# Patient Record
Sex: FEMALE | Race: AMERICAN INDIAN OR ALASKA NATIVE | NOT HISPANIC OR LATINO
[De-identification: names, ages, dates, MRNs, and addresses within clinical notes are randomized per-mention and may not be internally consistent; named-entity substitution may affect disease eponyms.]

---

## 2019-07-12 ENCOUNTER — APPOINTMENT (OUTPATIENT)
Dept: SURGERY | Facility: CLINIC | Age: 24
End: 2019-07-12
Payer: MEDICAID

## 2019-07-12 ENCOUNTER — TRANSCRIPTION ENCOUNTER (OUTPATIENT)
Age: 24
End: 2019-07-12

## 2019-07-12 ENCOUNTER — OUTPATIENT (OUTPATIENT)
Dept: OUTPATIENT SERVICES | Facility: HOSPITAL | Age: 24
LOS: 1 days | End: 2019-07-12
Payer: MEDICAID

## 2019-07-12 VITALS
HEIGHT: 65.5 IN | TEMPERATURE: 97.6 F | HEART RATE: 72 BPM | SYSTOLIC BLOOD PRESSURE: 111 MMHG | BODY MASS INDEX: 39.03 KG/M2 | WEIGHT: 237.13 LBS | OXYGEN SATURATION: 98 % | DIASTOLIC BLOOD PRESSURE: 76 MMHG

## 2019-07-12 LAB — CA-I SERPL-SCNC: 1.26 MMOL/L

## 2019-07-12 PROCEDURE — 74241: CPT

## 2019-07-12 PROCEDURE — 99203 OFFICE O/P NEW LOW 30 MIN: CPT

## 2019-07-12 PROCEDURE — 74241: CPT | Mod: 26

## 2019-07-13 LAB
ALBUMIN SERPL ELPH-MCNC: 4.4 G/DL
ALP BLD-CCNC: 77 U/L
ALT SERPL-CCNC: 14 U/L
ANION GAP SERPL CALC-SCNC: 14 MMOL/L
AST SERPL-CCNC: 20 U/L
BASOPHILS # BLD AUTO: 0.02 K/UL
BASOPHILS NFR BLD AUTO: 0.5 %
BILIRUB SERPL-MCNC: 0.7 MG/DL
BUN SERPL-MCNC: 10 MG/DL
CALCIUM SERPL-MCNC: 9.5 MG/DL
CALCIUM SERPL-MCNC: 9.5 MG/DL
CHLORIDE SERPL-SCNC: 102 MMOL/L
CHOLEST SERPL-MCNC: 199 MG/DL
CHOLEST/HDLC SERPL: 4.9 RATIO
CO2 SERPL-SCNC: 23 MMOL/L
CREAT SERPL-MCNC: 0.7 MG/DL
EOSINOPHIL # BLD AUTO: 0.06 K/UL
EOSINOPHIL NFR BLD AUTO: 1.4 %
ESTIMATED AVERAGE GLUCOSE: 100 MG/DL
FOLATE SERPL-MCNC: 13.1 NG/ML
GLUCOSE SERPL-MCNC: 86 MG/DL
HBA1C MFR BLD HPLC: 5.1 %
HCT VFR BLD CALC: 41.6 %
HDLC SERPL-MCNC: 41 MG/DL
HGB BLD-MCNC: 12.4 G/DL
IMM GRANULOCYTES NFR BLD AUTO: 0 %
INR PPP: 1.01 RATIO
IRON SATN MFR SERPL: 17 %
IRON SERPL-MCNC: 87 UG/DL
LDLC SERPL CALC-MCNC: 111 MG/DL
LYMPHOCYTES # BLD AUTO: 2.3 K/UL
LYMPHOCYTES NFR BLD AUTO: 53.1 %
MAN DIFF?: NORMAL
MCHC RBC-ENTMCNC: 25.5 PG
MCHC RBC-ENTMCNC: 29.8 GM/DL
MCV RBC AUTO: 85.4 FL
MONOCYTES # BLD AUTO: 0.3 K/UL
MONOCYTES NFR BLD AUTO: 6.9 %
NEUTROPHILS # BLD AUTO: 1.65 K/UL
NEUTROPHILS NFR BLD AUTO: 38.1 %
PARATHYROID HORMONE INTACT: 82 PG/ML
PLATELET # BLD AUTO: 237 K/UL
POTASSIUM SERPL-SCNC: 4.7 MMOL/L
PREALB SERPL NEPH-MCNC: 29 MG/DL
PROT SERPL-MCNC: 7.5 G/DL
PT BLD: 11.6 SEC
RBC # BLD: 4.87 M/UL
RBC # FLD: 13.1 %
SODIUM SERPL-SCNC: 139 MMOL/L
TIBC SERPL-MCNC: 498 UG/DL
TRIGL SERPL-MCNC: 236 MG/DL
TSH SERPL-ACNC: 1.5 UIU/ML
UIBC SERPL-MCNC: 411 UG/DL
VIT B12 SERPL-MCNC: 153 PG/ML
WBC # FLD AUTO: 4.33 K/UL

## 2019-07-14 LAB — 25(OH)D3 SERPL-MCNC: 9.8 NG/ML

## 2019-07-16 LAB
A-TOCOPHEROL VIT E SERPL-MCNC: 14.3 MG/L
BETA+GAMMA TOCOPHEROL SERPL-MCNC: 2 MG/L
VIT A SERPL-MCNC: 50.4 UG/DL
VIT B1 SERPL-MCNC: 88 NMOL/L
ZINC SERPL-MCNC: 37 UG/DL

## 2019-08-07 ENCOUNTER — RESULT REVIEW (OUTPATIENT)
Age: 24
End: 2019-08-07

## 2019-08-07 ENCOUNTER — APPOINTMENT (OUTPATIENT)
Dept: GASTROENTEROLOGY | Facility: HOSPITAL | Age: 24
End: 2019-08-07

## 2019-08-07 ENCOUNTER — OUTPATIENT (OUTPATIENT)
Dept: OUTPATIENT SERVICES | Facility: HOSPITAL | Age: 24
LOS: 1 days | Discharge: HOME | End: 2019-08-07
Payer: MEDICAID

## 2019-08-07 PROCEDURE — 43239 EGD BIOPSY SINGLE/MULTIPLE: CPT

## 2019-08-07 PROCEDURE — 88305 TISSUE EXAM BY PATHOLOGIST: CPT

## 2019-08-08 ENCOUNTER — APPOINTMENT (OUTPATIENT)
Dept: BARIATRICS | Facility: CLINIC | Age: 24
End: 2019-08-08

## 2019-08-08 LAB — SURGICAL PATHOLOGY STUDY: SIGNIFICANT CHANGE UP

## 2019-08-14 ENCOUNTER — APPOINTMENT (OUTPATIENT)
Dept: BARIATRICS | Facility: CLINIC | Age: 24
End: 2019-08-14

## 2019-08-16 ENCOUNTER — MESSAGE (OUTPATIENT)
Age: 24
End: 2019-08-16

## 2019-08-19 ENCOUNTER — APPOINTMENT (OUTPATIENT)
Dept: BARIATRICS | Facility: CLINIC | Age: 24
End: 2019-08-19

## 2019-08-28 RX ORDER — FENUGREEK SEED/BL.THISTLE/ANIS 340 MG
18-0.8 & 29 CAPSULE ORAL TWICE DAILY
Qty: 60 | Refills: 4 | Status: ACTIVE | COMMUNITY
Start: 2019-08-28 | End: 1900-01-01

## 2019-09-10 ENCOUNTER — APPOINTMENT (OUTPATIENT)
Dept: BARIATRICS | Facility: CLINIC | Age: 24
End: 2019-09-10

## 2019-09-10 ENCOUNTER — APPOINTMENT (OUTPATIENT)
Dept: SURGERY | Facility: CLINIC | Age: 24
End: 2019-09-10

## 2019-09-12 ENCOUNTER — APPOINTMENT (OUTPATIENT)
Dept: BARIATRICS | Facility: CLINIC | Age: 24
End: 2019-09-12

## 2019-12-18 ENCOUNTER — APPOINTMENT (OUTPATIENT)
Dept: BARIATRICS | Facility: CLINIC | Age: 24
End: 2019-12-18

## 2019-12-20 ENCOUNTER — APPOINTMENT (OUTPATIENT)
Dept: SURGERY | Facility: CLINIC | Age: 24
End: 2019-12-20

## 2020-02-06 ENCOUNTER — APPOINTMENT (OUTPATIENT)
Dept: BARIATRICS | Facility: CLINIC | Age: 25
End: 2020-02-06

## 2020-03-10 ENCOUNTER — APPOINTMENT (OUTPATIENT)
Dept: BARIATRICS | Facility: CLINIC | Age: 25
End: 2020-03-10

## 2020-03-23 ENCOUNTER — APPOINTMENT (OUTPATIENT)
Dept: BARIATRICS | Facility: CLINIC | Age: 25
End: 2020-03-23

## 2020-04-21 ENCOUNTER — APPOINTMENT (OUTPATIENT)
Dept: BARIATRICS | Facility: CLINIC | Age: 25
End: 2020-04-21

## 2020-04-23 ENCOUNTER — APPOINTMENT (OUTPATIENT)
Dept: BARIATRICS | Facility: CLINIC | Age: 25
End: 2020-04-23
Payer: MEDICAID

## 2020-04-23 PROCEDURE — 98968 PH1 ASSMT&MGMT NQHP 21-30: CPT

## 2020-04-23 PROCEDURE — 97803 MED NUTRITION INDIV SUBSEQ: CPT

## 2020-04-24 ENCOUNTER — APPOINTMENT (OUTPATIENT)
Dept: SURGERY | Facility: CLINIC | Age: 25
End: 2020-04-24

## 2020-05-01 NOTE — HISTORY OF PRESENT ILLNESS
[de-identified] : 25 y/o F with PSHx of gastric bypass with  at [facility] in 2011 and a Tummy tuck. She presents here with interest in revision surgery. She reports a 110lbs weight loss with the initial surgery. She attributes her recent weight gain to giving birth to her daughter. She states that she has attempted multiple diets with no success. No significant PMHX. Currently not on any medications. Additionally, reports having occasional abdominal pain that resolves on its own. She also reports having regular bowel movements.\par \par She works as a  at night.

## 2020-05-01 NOTE — ASSESSMENT
[FreeTextEntry1] : 25 y/o F with PSHx of gastric bypass with  at [facility] in 2011, presenting for consultation on revision bariatric surgery. Patient initially successful with initial surgery but now with weight gain. Patient will begin only limited work up for revision surgery with UGI Xray, Endoscopy, nutrition and blood work to evaluate if candidate for surgery. \par \par Pt to f/u for results, depending on results will send for remaining work up of 6 month weights, us, psych and letter of support.

## 2020-05-01 NOTE — END OF VISIT
[FreeTextEntry3] : All medical record entries made by the Scribe were at my, Dr. Grossman's direction and personally dictated by me on 07/12/2019  I have reviewed the chart and agree that the record accurately reflects my personal performance of the history, physical exam, assessment and plan. I have also personally directed, reviewed, and agreed with the chart.\par \par

## 2020-05-01 NOTE — ADDENDUM
[FreeTextEntry1] : Documented by Destin Rose acting as a scribe for Dr. Jeronimo Grossman on 07/12/2019\par

## 2020-05-08 ENCOUNTER — APPOINTMENT (OUTPATIENT)
Dept: SURGERY | Facility: CLINIC | Age: 25
End: 2020-05-08
Payer: MEDICAID

## 2020-05-08 VITALS — BODY MASS INDEX: 38.02 KG/M2 | HEIGHT: 65.5 IN | WEIGHT: 231 LBS

## 2020-05-08 VITALS — HEIGHT: 65.5 IN | BODY MASS INDEX: 38.02 KG/M2 | WEIGHT: 231 LBS

## 2020-05-08 PROCEDURE — 99442: CPT

## 2020-05-08 NOTE — HISTORY OF PRESENT ILLNESS
[de-identified] : Pt is a 26 y/o F in the workup process for revisional bariatric surgery s/p bypass with  in 2011 at [facility] who agreed to proceed with a phone consultation amid covi19. After speaking to the pt, she states she was unaware she needed to complete 6 consecutive monthly weigh ins, her wt was recorded today. Pt has completed UGI 7/2019 and egd and labs from 2019. Informed pt the few tests she completed will be expiring soon and that they will likely need to be repeated which she understands. New orders will be placed. Pt advised to f/u in office for next visit to sit down and review workup. Approximately 20 minutes were spent consulting with the pt today.

## 2020-05-08 NOTE — PLAN
[FreeTextEntry1] : Pt to begin workup testings\par Pt was unaware of 6 weigh ins, May wt inputed as likely 1/6\par F/u in person next visit

## 2020-05-29 ENCOUNTER — APPOINTMENT (OUTPATIENT)
Dept: ULTRASOUND IMAGING | Facility: CLINIC | Age: 25
End: 2020-05-29

## 2020-06-16 VITALS — HEIGHT: 65.5 IN | WEIGHT: 229 LBS | BODY MASS INDEX: 37.69 KG/M2

## 2020-07-15 VITALS — WEIGHT: 232 LBS | BODY MASS INDEX: 38.19 KG/M2 | HEIGHT: 65.5 IN

## 2020-08-10 VITALS — HEIGHT: 65.5 IN | BODY MASS INDEX: 38.68 KG/M2 | WEIGHT: 235 LBS

## 2020-09-22 VITALS — BODY MASS INDEX: 39.17 KG/M2 | WEIGHT: 238 LBS | HEIGHT: 65.5 IN

## 2020-10-19 VITALS — WEIGHT: 236 LBS | HEIGHT: 65.5 IN | BODY MASS INDEX: 38.85 KG/M2

## 2020-11-13 ENCOUNTER — LABORATORY RESULT (OUTPATIENT)
Age: 25
End: 2020-11-13

## 2020-11-13 ENCOUNTER — APPOINTMENT (OUTPATIENT)
Dept: SURGERY | Facility: CLINIC | Age: 25
End: 2020-11-13
Payer: MEDICAID

## 2020-11-13 VITALS
HEART RATE: 81 BPM | SYSTOLIC BLOOD PRESSURE: 113 MMHG | BODY MASS INDEX: 38.6 KG/M2 | DIASTOLIC BLOOD PRESSURE: 75 MMHG | WEIGHT: 234.5 LBS | OXYGEN SATURATION: 100 % | TEMPERATURE: 97.1 F | HEIGHT: 65.5 IN

## 2020-11-13 PROCEDURE — 99213 OFFICE O/P EST LOW 20 MIN: CPT

## 2020-11-13 PROCEDURE — 99072 ADDL SUPL MATRL&STAF TM PHE: CPT

## 2020-11-13 NOTE — ADDENDUM
[FreeTextEntry1] : This note was written by Melissa Newby on 11/13/2020 acting as scribe for Dr. Grossman

## 2020-11-13 NOTE — HISTORY OF PRESENT ILLNESS
[de-identified] : Pt is a 26 y/o F with PSHx of RYGB in  at [facility] with Dr. Grossman who presents for follow up in the process of revisional bariatric surgery. Pt is doing well today. She has been recording weights every month with her doctor. She states she did psych and nutrition last year but believes they might be .\par

## 2020-11-13 NOTE — END OF VISIT
[FreeTextEntry3] : All medical record entries made by the Scribe were at my, Dr. Grossman's, discretion and personally dictated by me on 11/13/2020. I have reviewed the chart and agree that the record accurately reflects my personal performance of the history, physical exam, assessment and plan. I have also personally directed, reviewed and agreed to the chart.

## 2020-11-14 LAB
25(OH)D3 SERPL-MCNC: 18.2 NG/ML
ALBUMIN SERPL ELPH-MCNC: 4.3 G/DL
ALP BLD-CCNC: 89 U/L
ALT SERPL-CCNC: 24 U/L
ANION GAP SERPL CALC-SCNC: 16 MMOL/L
APPEARANCE: CLEAR
APTT BLD: 31.7 SEC
AST SERPL-CCNC: 25 U/L
BACTERIA: NEGATIVE
BASOPHILS # BLD AUTO: 0.02 K/UL
BASOPHILS NFR BLD AUTO: 0.5 %
BILIRUB SERPL-MCNC: 0.6 MG/DL
BILIRUBIN URINE: NEGATIVE
BLOOD URINE: NORMAL
BUN SERPL-MCNC: 10 MG/DL
CALCIUM SERPL-MCNC: 9.1 MG/DL
CHLORIDE SERPL-SCNC: 102 MMOL/L
CO2 SERPL-SCNC: 23 MMOL/L
COLOR: YELLOW
CREAT SERPL-MCNC: 0.69 MG/DL
EOSINOPHIL # BLD AUTO: 0.08 K/UL
EOSINOPHIL NFR BLD AUTO: 1.8 %
FOLATE SERPL-MCNC: 7.9 NG/ML
GLUCOSE QUALITATIVE U: NEGATIVE
GLUCOSE SERPL-MCNC: 81 MG/DL
HCG SERPL QL: NEGATIVE
HCT VFR BLD CALC: 41.2 %
HGB BLD-MCNC: 11.9 G/DL
HYALINE CASTS: 1 /LPF
IMM GRANULOCYTES NFR BLD AUTO: 0 %
INR PPP: 1.06 RATIO
IRON SERPL-MCNC: 78 UG/DL
KETONES URINE: NEGATIVE
LEUKOCYTE ESTERASE URINE: NEGATIVE
LYMPHOCYTES # BLD AUTO: 2.6 K/UL
LYMPHOCYTES NFR BLD AUTO: 59.5 %
MAN DIFF?: NORMAL
MCHC RBC-ENTMCNC: 24.7 PG
MCHC RBC-ENTMCNC: 28.9 GM/DL
MCV RBC AUTO: 85.7 FL
MICROSCOPIC-UA: NORMAL
MONOCYTES # BLD AUTO: 0.29 K/UL
MONOCYTES NFR BLD AUTO: 6.6 %
NEUTROPHILS # BLD AUTO: 1.38 K/UL
NEUTROPHILS NFR BLD AUTO: 31.6 %
NITRITE URINE: NEGATIVE
PAPP-A SERPL-ACNC: <1 MIU/ML
PH URINE: 6
PLATELET # BLD AUTO: 248 K/UL
POTASSIUM SERPL-SCNC: 4.7 MMOL/L
PROT SERPL-MCNC: 7.2 G/DL
PROTEIN URINE: NEGATIVE
PT BLD: 12.5 SEC
RBC # BLD: 4.81 M/UL
RBC # FLD: 14 %
RED BLOOD CELLS URINE: 3 /HPF
SODIUM SERPL-SCNC: 140 MMOL/L
SPECIFIC GRAVITY URINE: 1.03
SQUAMOUS EPITHELIAL CELLS: 1 /HPF
TSH SERPL-ACNC: 1.35 UIU/ML
UROBILINOGEN URINE: NORMAL
VIT B12 SERPL-MCNC: 206 PG/ML
WBC # FLD AUTO: 4.37 K/UL
WHITE BLOOD CELLS URINE: 0 /HPF

## 2020-11-23 LAB — VIT B1 SERPL-MCNC: 84.7 NMOL/L

## 2020-12-17 ENCOUNTER — APPOINTMENT (OUTPATIENT)
Dept: BARIATRICS | Facility: CLINIC | Age: 25
End: 2020-12-17
Payer: MEDICAID

## 2020-12-17 PROCEDURE — 97803 MED NUTRITION INDIV SUBSEQ: CPT | Mod: 95

## 2021-03-18 NOTE — HISTORY OF PRESENT ILLNESS
[TextBox_4] : 26 yr old female with obesity.  Presents today for pulmonary clearance for bariatric surgery. \par \par

## 2021-03-18 NOTE — REASON FOR VISIT
[Initial] : an initial visit [Pre-op Risk Stratification] : pre-op risk stratification [TextBox_44] : bariatric surgery

## 2021-03-22 ENCOUNTER — APPOINTMENT (OUTPATIENT)
Dept: PULMONOLOGY | Facility: CLINIC | Age: 26
End: 2021-03-22

## 2021-04-05 ENCOUNTER — APPOINTMENT (OUTPATIENT)
Dept: PULMONOLOGY | Facility: CLINIC | Age: 26
End: 2021-04-05

## 2021-04-16 ENCOUNTER — APPOINTMENT (OUTPATIENT)
Dept: HEART AND VASCULAR | Facility: CLINIC | Age: 26
End: 2021-04-16

## 2021-09-01 ENCOUNTER — APPOINTMENT (OUTPATIENT)
Dept: PULMONOLOGY | Facility: CLINIC | Age: 26
End: 2021-09-01
Payer: MEDICAID

## 2021-09-01 ENCOUNTER — OUTPATIENT (OUTPATIENT)
Dept: OUTPATIENT SERVICES | Facility: HOSPITAL | Age: 26
LOS: 1 days | End: 2021-09-01
Payer: MEDICAID

## 2021-09-01 VITALS
WEIGHT: 222 LBS | OXYGEN SATURATION: 96 % | BODY MASS INDEX: 36.54 KG/M2 | HEIGHT: 65.5 IN | TEMPERATURE: 98 F | HEART RATE: 87 BPM

## 2021-09-01 DIAGNOSIS — Z11.59 ENCOUNTER FOR SCREENING FOR OTHER VIRAL DISEASES: ICD-10-CM

## 2021-09-01 DIAGNOSIS — Z83.3 FAMILY HISTORY OF DIABETES MELLITUS: ICD-10-CM

## 2021-09-01 DIAGNOSIS — Z78.9 OTHER SPECIFIED HEALTH STATUS: ICD-10-CM

## 2021-09-01 DIAGNOSIS — Z80.9 FAMILY HISTORY OF MALIGNANT NEOPLASM, UNSPECIFIED: ICD-10-CM

## 2021-09-01 PROCEDURE — 71046 X-RAY EXAM CHEST 2 VIEWS: CPT

## 2021-09-01 PROCEDURE — 71046 X-RAY EXAM CHEST 2 VIEWS: CPT | Mod: 26

## 2021-09-01 PROCEDURE — 99203 OFFICE O/P NEW LOW 30 MIN: CPT

## 2021-09-01 NOTE — HISTORY OF PRESENT ILLNESS
[TextBox_4] : 26 yr old female with PMH of obesity s/p gastric bypass.  Pt presents today for pulmonary clearance for bariatric surgery.\par \par Denies SOB, chest pain, coughing, wheezing, fever.   [Recent  Weight Gain] : recent weight gain [Snoring] : snoring [Tired while Driving] : not tired while driving

## 2021-09-01 NOTE — ASSESSMENT
[FreeTextEntry1] : snoring\par \par I discussed the short and long term health effect of the obstructive seep apnea with the patient. These effects include, but not limited to, uncontrolled hypertension, CAD, arrhythmias, sudden death, CVA, and pulmonary hypertension. I advised the patient to avoid sedatives, narcotics, driving, and sleeping pills in the meantime. I discussed the therapeutic options including but not limited to CPAP, surgery, and oral appliance. Further recommendations will follow after sleep study.\par \par preop\par \par ALICIA MARTIN  is optimized for surgery. she  is to be extubated once fully awake and able to protect airway.  The patient is to be monitored in the recovery room. They might benefit for high flow oxygen or noninvasive ventilation to prevent or reverse atelectasis.  Patient is to be admitted to a monitored bed postoperatively.  Avoid oversedation.  ALICIA is high risk for DVT and will require bimodal agents for DVT prophylaxis early mobilization is recommended. she is to use the incentive spirometry postoperative. \par \par - Follow with CXR, PFT and sleep study \par \par \par \par

## 2021-09-03 ENCOUNTER — APPOINTMENT (OUTPATIENT)
Dept: HEART AND VASCULAR | Facility: CLINIC | Age: 26
End: 2021-09-03

## 2021-09-13 ENCOUNTER — TRANSCRIPTION ENCOUNTER (OUTPATIENT)
Age: 26
End: 2021-09-13

## 2021-09-21 ENCOUNTER — APPOINTMENT (OUTPATIENT)
Dept: SURGERY | Facility: CLINIC | Age: 26
End: 2021-09-21

## 2021-09-24 ENCOUNTER — NON-APPOINTMENT (OUTPATIENT)
Age: 26
End: 2021-09-24

## 2021-09-27 ENCOUNTER — APPOINTMENT (OUTPATIENT)
Dept: BARIATRICS | Facility: CLINIC | Age: 26
End: 2021-09-27

## 2021-09-27 ENCOUNTER — APPOINTMENT (OUTPATIENT)
Dept: ULTRASOUND IMAGING | Facility: HOSPITAL | Age: 26
End: 2021-09-27

## 2021-09-27 ENCOUNTER — APPOINTMENT (OUTPATIENT)
Dept: RADIOLOGY | Facility: HOSPITAL | Age: 26
End: 2021-09-27

## 2021-09-29 ENCOUNTER — APPOINTMENT (OUTPATIENT)
Dept: PULMONOLOGY | Facility: CLINIC | Age: 26
End: 2021-09-29

## 2021-10-28 ENCOUNTER — APPOINTMENT (OUTPATIENT)
Dept: PULMONOLOGY | Facility: CLINIC | Age: 26
End: 2021-10-28
Payer: MEDICAID

## 2021-10-28 ENCOUNTER — APPOINTMENT (OUTPATIENT)
Dept: HEART AND VASCULAR | Facility: CLINIC | Age: 26
End: 2021-10-28
Payer: MEDICAID

## 2021-10-28 VITALS
HEIGHT: 65.5 IN | DIASTOLIC BLOOD PRESSURE: 70 MMHG | RESPIRATION RATE: 15 BRPM | BODY MASS INDEX: 37.37 KG/M2 | SYSTOLIC BLOOD PRESSURE: 122 MMHG | OXYGEN SATURATION: 97 % | WEIGHT: 227 LBS | HEART RATE: 95 BPM | TEMPERATURE: 98 F

## 2021-10-28 PROCEDURE — ZZZZZ: CPT

## 2021-10-28 PROCEDURE — 94060 EVALUATION OF WHEEZING: CPT

## 2021-10-28 PROCEDURE — 94729 DIFFUSING CAPACITY: CPT

## 2021-10-28 PROCEDURE — 94727 GAS DIL/WSHOT DETER LNG VOL: CPT

## 2021-10-28 PROCEDURE — 99204 OFFICE O/P NEW MOD 45 MIN: CPT

## 2021-10-28 RX ORDER — VITAMIN B COMPLEX
2500 TABLET ORAL WEEKLY
Qty: 12 | Refills: 6 | Status: DISCONTINUED | COMMUNITY
Start: 2019-08-28 | End: 2021-10-28

## 2021-10-28 RX ORDER — CALCIUM CITRATE/VITAMIN D3 200MG-6.25
200-250 TABLET ORAL DAILY
Qty: 60 | Refills: 3 | Status: DISCONTINUED | COMMUNITY
Start: 2019-08-28 | End: 2021-10-28

## 2021-10-28 RX ORDER — ERGOCALCIFEROL 1.25 MG/1
1.25 MG CAPSULE, LIQUID FILLED ORAL
Qty: 12 | Refills: 0 | Status: DISCONTINUED | COMMUNITY
Start: 2019-08-28 | End: 2021-10-28

## 2021-11-01 ENCOUNTER — TRANSCRIPTION ENCOUNTER (OUTPATIENT)
Age: 26
End: 2021-11-01

## 2021-11-03 ENCOUNTER — APPOINTMENT (OUTPATIENT)
Dept: HEART AND VASCULAR | Facility: CLINIC | Age: 26
End: 2021-11-03
Payer: MEDICAID

## 2021-11-03 VITALS
RESPIRATION RATE: 14 BRPM | HEIGHT: 65 IN | WEIGHT: 227 LBS | DIASTOLIC BLOOD PRESSURE: 72 MMHG | OXYGEN SATURATION: 98 % | TEMPERATURE: 98 F | HEART RATE: 93 BPM | BODY MASS INDEX: 37.82 KG/M2 | SYSTOLIC BLOOD PRESSURE: 120 MMHG

## 2021-11-03 DIAGNOSIS — Z01.810 ENCOUNTER FOR PREPROCEDURAL CARDIOVASCULAR EXAMINATION: ICD-10-CM

## 2021-11-03 PROCEDURE — 93306 TTE W/DOPPLER COMPLETE: CPT

## 2021-11-03 PROCEDURE — 99213 OFFICE O/P EST LOW 20 MIN: CPT | Mod: 25

## 2021-11-03 PROCEDURE — 93015 CV STRESS TEST SUPVJ I&R: CPT

## 2021-11-03 NOTE — HISTORY OF PRESENT ILLNESS
[FreeTextEntry1] : for cardiac evaluation prior to bariatric surgery\par good functional capacity\par normal stress and echo today, results discussed

## 2021-11-22 ENCOUNTER — APPOINTMENT (OUTPATIENT)
Dept: PULMONOLOGY | Facility: CLINIC | Age: 26
End: 2021-11-22

## 2021-11-22 DIAGNOSIS — Z01.811 ENCOUNTER FOR PREPROCEDURAL RESPIRATORY EXAMINATION: ICD-10-CM

## 2021-11-22 DIAGNOSIS — R06.02 SHORTNESS OF BREATH: ICD-10-CM

## 2021-11-22 DIAGNOSIS — Z86.19 PERSONAL HISTORY OF OTHER INFECTIOUS AND PARASITIC DISEASES: ICD-10-CM

## 2021-11-22 DIAGNOSIS — R06.83 SNORING: ICD-10-CM

## 2021-11-22 NOTE — ASSESSMENT
[FreeTextEntry1] : snoring\par \par I discussed the short and long term health effect of the obstructive seep apnea with the patient. These effects include, but not limited to, uncontrolled hypertension, CAD, arrhythmias, sudden death, CVA, and pulmonary hypertension. I advised the patient to avoid sedatives, narcotics, driving, and sleeping pills in the meantime. I discussed the therapeutic options including but not limited to CPAP, surgery, and oral appliance. Further recommendations will follow after sleep study.\par \par preop\par \par ALICIA MARTIN  is optimized for surgery. she  is to be extubated once fully awake and able to protect airway.  The patient is to be monitored in the recovery room. They might benefit for high flow oxygen or noninvasive ventilation to prevent or reverse atelectasis.  Patient is to be admitted to a monitored bed postoperatively.  Avoid oversedation.  ALICIA is high risk for DVT and will require bimodal agents for DVT prophylaxis early mobilization is recommended. she is to use the incentive spirometry postoperative. \par \par - PFT spirometry normal with  no change in bronchodilator, Lung capacity is normal and DLCO is normal. \par - Xray 9/1/2021 the lung are clear, no pleural effusion.  No active evidence of cardiopulmonary disease. \par \par \par \par

## 2021-11-22 NOTE — HISTORY OF PRESENT ILLNESS
[Recent  Weight Gain] : recent weight gain [Snoring] : snoring [TextBox_4] : 26 yr old female with PMH of obesity s/p gastric bypass.  Pt presents today for pulmonary clearance for bariatric surgery.\par \par Denies SOB, chest pain, coughing, wheezing, fever.   [Tired while Driving] : not tired while driving

## 2021-12-01 PROBLEM — Z86.19 HISTORY OF HELICOBACTER PYLORI INFECTION: Status: RESOLVED | Noted: 2021-12-01 | Resolved: 2021-12-01

## 2021-12-01 RX ORDER — ERGOCALCIFEROL 1.25 MG/1
1.25 MG CAPSULE, LIQUID FILLED ORAL
Qty: 8 | Refills: 0 | Status: ACTIVE | COMMUNITY
Start: 2021-12-01 | End: 1900-01-01

## 2021-12-01 RX ORDER — FENUGREEK SEED/BL.THISTLE/ANIS 340 MG
18-0.8 & 29 CAPSULE ORAL
Qty: 60 | Refills: 4 | Status: ACTIVE | COMMUNITY
Start: 2021-12-01 | End: 1900-01-01

## 2021-12-01 RX ORDER — FLUCONAZOLE 150 MG/1
150 TABLET ORAL DAILY
Qty: 5 | Refills: 0 | Status: ACTIVE | COMMUNITY
Start: 2021-12-01 | End: 1900-01-01

## 2021-12-01 RX ORDER — OMEPRAZOLE 20 MG/1
20 CAPSULE, DELAYED RELEASE ORAL TWICE DAILY
Qty: 28 | Refills: 0 | Status: ACTIVE | COMMUNITY
Start: 2021-12-01 | End: 1900-01-01

## 2021-12-01 RX ORDER — CLARITHROMYCIN 500 MG/1
500 TABLET, FILM COATED ORAL TWICE DAILY
Qty: 28 | Refills: 0 | Status: ACTIVE | COMMUNITY
Start: 2021-12-01 | End: 1900-01-01

## 2021-12-01 RX ORDER — VITAMIN B COMPLEX
2500 TABLET ORAL
Qty: 12 | Refills: 6 | Status: ACTIVE | COMMUNITY
Start: 2021-12-01 | End: 1900-01-01

## 2021-12-01 RX ORDER — AMOXICILLIN 500 MG/1
500 TABLET, FILM COATED ORAL TWICE DAILY
Qty: 56 | Refills: 0 | Status: ACTIVE | COMMUNITY
Start: 2021-12-01 | End: 1900-01-01

## 2021-12-02 ENCOUNTER — APPOINTMENT (OUTPATIENT)
Dept: SLEEP CENTER | Facility: HOME HEALTH | Age: 26
End: 2021-12-02
Payer: MEDICAID

## 2021-12-02 ENCOUNTER — OUTPATIENT (OUTPATIENT)
Dept: OUTPATIENT SERVICES | Facility: HOSPITAL | Age: 26
LOS: 1 days | End: 2021-12-02
Payer: MEDICAID

## 2021-12-02 PROCEDURE — 95800 SLP STDY UNATTENDED: CPT | Mod: 26

## 2021-12-02 PROCEDURE — 95800 SLP STDY UNATTENDED: CPT

## 2021-12-06 ENCOUNTER — APPOINTMENT (OUTPATIENT)
Dept: PULMONOLOGY | Facility: CLINIC | Age: 26
End: 2021-12-06

## 2021-12-06 DIAGNOSIS — G47.33 OBSTRUCTIVE SLEEP APNEA (ADULT) (PEDIATRIC): ICD-10-CM

## 2021-12-08 ENCOUNTER — TRANSCRIPTION ENCOUNTER (OUTPATIENT)
Age: 26
End: 2021-12-08

## 2021-12-11 VITALS — WEIGHT: 222 LBS | BODY MASS INDEX: 36.99 KG/M2 | HEIGHT: 65 IN

## 2021-12-13 ENCOUNTER — APPOINTMENT (OUTPATIENT)
Dept: PULMONOLOGY | Facility: CLINIC | Age: 26
End: 2021-12-13

## 2021-12-16 ENCOUNTER — NON-APPOINTMENT (OUTPATIENT)
Age: 26
End: 2021-12-16

## 2021-12-30 LAB
25(OH)D3 SERPL-MCNC: 16.2 NG/ML
A-TOCOPHEROL VIT E SERPL-MCNC: 11.8 MG/L
ALBUMIN SERPL ELPH-MCNC: 4.3 G/DL
ALP BLD-CCNC: 94 U/L
ALT SERPL-CCNC: 19 U/L
ANION GAP SERPL CALC-SCNC: 13 MMOL/L
APPEARANCE: ABNORMAL
AST SERPL-CCNC: 22 U/L
BACTERIA: ABNORMAL
BASOPHILS # BLD AUTO: 0.02 K/UL
BASOPHILS NFR BLD AUTO: 0.5 %
BETA+GAMMA TOCOPHEROL SERPL-MCNC: 1.3 MG/L
BILIRUB SERPL-MCNC: 0.5 MG/DL
BILIRUBIN URINE: NEGATIVE
BLOOD URINE: NEGATIVE
BUN SERPL-MCNC: 13 MG/DL
CA-I SERPL-SCNC: 1.2 MMOL/L
CALCIUM SERPL-MCNC: 9.4 MG/DL
CALCIUM SERPL-MCNC: 9.4 MG/DL
CHLORIDE SERPL-SCNC: 105 MMOL/L
CHOLEST SERPL-MCNC: 167 MG/DL
CO2 SERPL-SCNC: 24 MMOL/L
COLOR: YELLOW
CREAT SERPL-MCNC: 0.68 MG/DL
EOSINOPHIL # BLD AUTO: 0.15 K/UL
EOSINOPHIL NFR BLD AUTO: 3.6 %
ESTIMATED AVERAGE GLUCOSE: 108 MG/DL
FOLATE SERPL-MCNC: 19.3 NG/ML
GLUCOSE QUALITATIVE U: NEGATIVE
GLUCOSE SERPL-MCNC: 97 MG/DL
HBA1C MFR BLD HPLC: 5.4 %
HCG SERPL QL: NEGATIVE
HCT VFR BLD CALC: 38.8 %
HDLC SERPL-MCNC: 46 MG/DL
HGB BLD-MCNC: 12.2 G/DL
HYALINE CASTS: 3 /LPF
IMM GRANULOCYTES NFR BLD AUTO: 0 %
INR PPP: 1.05 RATIO
IRON SATN MFR SERPL: 9 %
IRON SERPL-MCNC: 43 UG/DL
KETONES URINE: NEGATIVE
LDLC SERPL CALC-MCNC: 79 MG/DL
LEUKOCYTE ESTERASE URINE: ABNORMAL
LYMPHOCYTES # BLD AUTO: 2.03 K/UL
LYMPHOCYTES NFR BLD AUTO: 49 %
MAN DIFF?: NORMAL
MCHC RBC-ENTMCNC: 26.1 PG
MCHC RBC-ENTMCNC: 31.4 GM/DL
MCV RBC AUTO: 82.9 FL
MICROSCOPIC-UA: NORMAL
MONOCYTES # BLD AUTO: 0.26 K/UL
MONOCYTES NFR BLD AUTO: 6.3 %
NEUTROPHILS # BLD AUTO: 1.68 K/UL
NEUTROPHILS NFR BLD AUTO: 40.6 %
NITRITE URINE: NEGATIVE
NONHDLC SERPL-MCNC: 121 MG/DL
PAPP-A SERPL-ACNC: <1 MIU/ML
PARATHYROID HORMONE INTACT: 41 PG/ML
PH URINE: 6
PLATELET # BLD AUTO: 261 K/UL
POTASSIUM SERPL-SCNC: 4.1 MMOL/L
PREALB SERPL NEPH-MCNC: 25 MG/DL
PROT SERPL-MCNC: 7.4 G/DL
PROTEIN URINE: NORMAL
PT BLD: 12.4 SEC
RBC # BLD: 4.68 M/UL
RBC # FLD: 13.4 %
RED BLOOD CELLS URINE: 5 /HPF
SODIUM SERPL-SCNC: 141 MMOL/L
SPECIFIC GRAVITY URINE: 1.02
SQUAMOUS EPITHELIAL CELLS: 20 /HPF
TIBC SERPL-MCNC: 494 UG/DL
TRIGL SERPL-MCNC: 208 MG/DL
TSH SERPL-ACNC: 1.56 UIU/ML
UIBC SERPL-MCNC: 451 UG/DL
UREA BREATH TEST QL: POSITIVE
UROBILINOGEN URINE: NORMAL
VIT A SERPL-MCNC: 51.7 UG/DL
VIT B1 SERPL-MCNC: 105.4 NMOL/L
VIT B12 SERPL-MCNC: 171 PG/ML
WBC # FLD AUTO: 4.14 K/UL
WHITE BLOOD CELLS URINE: 21 /HPF
ZINC SERPL-MCNC: 101 UG/DL

## 2022-01-11 VITALS — HEIGHT: 65 IN | WEIGHT: 225 LBS | BODY MASS INDEX: 37.49 KG/M2

## 2022-02-03 DIAGNOSIS — Z00.00 ENCOUNTER FOR GENERAL ADULT MEDICAL EXAMINATION W/OUT ABNORMAL FINDINGS: ICD-10-CM

## 2022-02-09 ENCOUNTER — APPOINTMENT (OUTPATIENT)
Dept: RADIOLOGY | Facility: HOSPITAL | Age: 27
End: 2022-02-09

## 2022-02-11 VITALS — BODY MASS INDEX: 38.65 KG/M2 | HEIGHT: 65 IN | WEIGHT: 232 LBS

## 2022-02-13 ENCOUNTER — OUTPATIENT (OUTPATIENT)
Dept: OUTPATIENT SERVICES | Facility: HOSPITAL | Age: 27
LOS: 1 days | End: 2022-02-13
Payer: MEDICAID

## 2022-02-13 ENCOUNTER — APPOINTMENT (OUTPATIENT)
Dept: ULTRASOUND IMAGING | Facility: HOSPITAL | Age: 27
End: 2022-02-13
Payer: MEDICAID

## 2022-02-13 PROCEDURE — 76700 US EXAM ABDOM COMPLETE: CPT

## 2022-02-13 PROCEDURE — 76700 US EXAM ABDOM COMPLETE: CPT | Mod: 26

## 2022-04-11 PROBLEM — Z11.59 SCREENING FOR VIRAL DISEASE: Status: ACTIVE | Noted: 2021-09-01

## 2022-04-14 ENCOUNTER — APPOINTMENT (OUTPATIENT)
Dept: BARIATRICS | Facility: CLINIC | Age: 27
End: 2022-04-14
Payer: MEDICAID

## 2022-04-14 VITALS
HEIGHT: 65 IN | OXYGEN SATURATION: 98 % | BODY MASS INDEX: 38.82 KG/M2 | SYSTOLIC BLOOD PRESSURE: 116 MMHG | TEMPERATURE: 97.6 F | WEIGHT: 233 LBS | DIASTOLIC BLOOD PRESSURE: 71 MMHG | HEART RATE: 89 BPM

## 2022-04-14 PROCEDURE — 99214 OFFICE O/P EST MOD 30 MIN: CPT

## 2022-04-20 ENCOUNTER — APPOINTMENT (OUTPATIENT)
Dept: BARIATRICS | Facility: CLINIC | Age: 27
End: 2022-04-20
Payer: MEDICAID

## 2022-04-20 VITALS — BODY MASS INDEX: 37.45 KG/M2 | HEIGHT: 66 IN | WEIGHT: 233 LBS

## 2022-04-20 PROCEDURE — 99204 OFFICE O/P NEW MOD 45 MIN: CPT | Mod: 95

## 2022-04-20 NOTE — ASSESSMENT
[FreeTextEntry1] : Patient is a 27 year old F with PSHx of RYGB in 2011 at [facility] with Dr. Grossman who presents for follow up to discuss revisional bariatric surgery. Her BMI is 38. Patient advised to start trial of medication therapy to help with weight loss prior to considering revisional bariatric surgery. Patient has intact gastric bypass and risk of surgery exceeds her risk of obesity. Reviewed benefits of medication therapy such as Ozempic. Referred to medication weight loss center.

## 2022-04-20 NOTE — END OF VISIT
[Time Spent: ___ minutes] : I have spent [unfilled] minutes of time on the encounter. [FreeTextEntry3] : All medical record entries made by the Scribe were at my, Dr. Smith's, discretion and personally dictated by me on 04/14/2022. I have reviewed the chart and agree that the record accurately reflects my personal performance of the history, physical exam, assessment and plan. I have also personally directed, reviewed and agreed to the chart.

## 2022-04-20 NOTE — REASON FOR VISIT
[Home] : at home, [unfilled] , at the time of the visit. [Medical Office: ([facility])___] : at the medical office located in  [Follow-Up Visit] : a follow-up visit for [Morbid Obesity (BMI<40)] : morbid obesity (bmi<40) [S/P Bariatric Surgery] : s/p bariatric surgery

## 2022-04-20 NOTE — ADDENDUM
[FreeTextEntry1] : All medical record entries made by the Scribe were at my, MAGGI Miller's direction and personally dictated by me on 04/20/2022. I have received the chart and agree that the record accurately reflects my personal performance of the history, physical exam, assessment, and plan. I have also personally directed, reviewed, and agreed with the chart.

## 2022-04-20 NOTE — HISTORY OF PRESENT ILLNESS
[de-identified] : Pt is a 28 y/o F with PSHx of RYGB in 2011 at [facility] with Dr. Grossman who presents for initial consult to discuss weight regain. Her current 233 lb for BMI of 38.8. Patient reports she is interested in having a bypass revision. She went through a revision workup with Dr. Grossman.

## 2022-04-20 NOTE — ADDENDUM
[FreeTextEntry1] : This note was written by Melissa Newby on 04/14/2022 acting as scribe for Dr. Smith.

## 2022-04-20 NOTE — ASSESSMENT
[FreeTextEntry1] : Pt is a 28 y/o F with PSHx of RYGB in 2011 at [facility] with Dr. Grossman here to discuss revision surgery. She went through revision workup with Dr. Grossman. Informed patient that although I will not perform a revision bypass surgery, I recommend she follow up with Dr. Grossman in [place]. Explained to patient that revision surgeries carry higher risk than primary procedures. Discussed option of endoscopic procedure that will most likely lead to ~20-25 lb weight loss, but patient expresses she wants more weight loss. Pt will consider her options and follow up as needed.\par \par

## 2022-12-12 ENCOUNTER — APPOINTMENT (OUTPATIENT)
Dept: SURGERY | Facility: CLINIC | Age: 27
End: 2022-12-12

## 2023-01-30 ENCOUNTER — APPOINTMENT (OUTPATIENT)
Dept: SURGERY | Facility: CLINIC | Age: 28
End: 2023-01-30

## 2023-01-30 ENCOUNTER — APPOINTMENT (OUTPATIENT)
Dept: BARIATRICS | Facility: CLINIC | Age: 28
End: 2023-01-30

## 2023-06-05 ENCOUNTER — APPOINTMENT (OUTPATIENT)
Dept: SURGERY | Facility: CLINIC | Age: 28
End: 2023-06-05

## 2023-08-17 ENCOUNTER — APPOINTMENT (OUTPATIENT)
Dept: SURGERY | Facility: CLINIC | Age: 28
End: 2023-08-17
Payer: MEDICAID

## 2023-08-17 VITALS
HEART RATE: 90 BPM | DIASTOLIC BLOOD PRESSURE: 88 MMHG | HEIGHT: 65 IN | BODY MASS INDEX: 38.82 KG/M2 | SYSTOLIC BLOOD PRESSURE: 133 MMHG | WEIGHT: 233 LBS

## 2023-08-17 DIAGNOSIS — A04.8 OTHER SPECIFIED BACTERIAL INTESTINAL INFECTIONS: ICD-10-CM

## 2023-08-17 DIAGNOSIS — Z98.84 BARIATRIC SURGERY STATUS: ICD-10-CM

## 2023-08-17 DIAGNOSIS — E55.9 VITAMIN D DEFICIENCY, UNSPECIFIED: ICD-10-CM

## 2023-08-17 DIAGNOSIS — G47.33 OBSTRUCTIVE SLEEP APNEA (ADULT) (PEDIATRIC): ICD-10-CM

## 2023-08-17 PROCEDURE — 99213 OFFICE O/P EST LOW 20 MIN: CPT

## 2023-08-17 NOTE — HISTORY OF PRESENT ILLNESS
[de-identified] : 28 yr old female well known to me , s/p gastric bypass in 2011 at [facility] for evaluation for possible revision Patient  underwent most of the work up for revision with exception of GI endoscopy

## 2023-09-26 ENCOUNTER — APPOINTMENT (OUTPATIENT)
Age: 28
End: 2023-09-26
Payer: MEDICAID

## 2023-09-26 VITALS
HEART RATE: 75 BPM | BODY MASS INDEX: 37.99 KG/M2 | OXYGEN SATURATION: 97 % | HEIGHT: 65 IN | SYSTOLIC BLOOD PRESSURE: 132 MMHG | DIASTOLIC BLOOD PRESSURE: 80 MMHG | TEMPERATURE: 95.9 F | WEIGHT: 228 LBS | RESPIRATION RATE: 14 BRPM

## 2023-09-26 DIAGNOSIS — E66.9 OBESITY, UNSPECIFIED: ICD-10-CM

## 2023-09-26 PROCEDURE — 99203 OFFICE O/P NEW LOW 30 MIN: CPT

## 2024-01-09 ENCOUNTER — RESULT REVIEW (OUTPATIENT)
Age: 29
End: 2024-01-09

## 2024-01-09 ENCOUNTER — OUTPATIENT (OUTPATIENT)
Dept: OUTPATIENT SERVICES | Facility: HOSPITAL | Age: 29
LOS: 1 days | Discharge: HOME | End: 2024-01-09
Payer: MEDICAID

## 2024-01-09 ENCOUNTER — APPOINTMENT (OUTPATIENT)
Dept: GASTROENTEROLOGY | Facility: HOSPITAL | Age: 29
End: 2024-01-09

## 2024-01-09 PROCEDURE — 43239 EGD BIOPSY SINGLE/MULTIPLE: CPT

## 2024-01-09 PROCEDURE — 88305 TISSUE EXAM BY PATHOLOGIST: CPT | Mod: 26

## 2024-01-09 PROCEDURE — 88305 TISSUE EXAM BY PATHOLOGIST: CPT

## 2024-01-09 PROCEDURE — 43235 EGD DIAGNOSTIC BRUSH WASH: CPT | Mod: GC

## 2024-01-09 NOTE — PRE-ANESTHESIA EVALUATION ADULT - NSANTHPMHFT_GEN_ALL_CORE
HPI- 28F PMHx Class II obesity s/p RYGB (2011, Dr Grossman), hx ectopic pregnancy s/p fallopian tube removal (R) presenting to clinic for EGD evaluation for possible bypass revision.      EGD for evaluation prior to consideration of revision of her RYGB.   States undergoing a RYGB in 2011 with Dr Grossman.   Prior to surgery, states her weight to be >200 lb.   Chad weight of 155 lb however following an ectopic pregnancy in 2020 requiring left fallopian tube removal, she had gained a lot of weight back. Current weight of 228 lb (BMI 37.94).     More recently noted having 2 days of diarrhea, non-bloody which resolved on its own.   Lastly, notes having an episode of lower abdominal pain, prompting her to go to the ED at [facility] ([facility]). In the ED had a CT A/P performed (8/4/23), noting a 4.6 x 3.6 cm soft tissue attenuating mass centered between the left ovary and uterine fundus, possible fallopian in origin. Had pregnancy test performed then which was negative but did say she had a positive pregnancy test earlier in May 2023. States she has followed up with her Gyn since her ED visit, and rule out for an ectopic pregnancy. States that she still needs to get a MRI performed to further evaluate this soft tissue mass.     PMHx - Class II obesity s/p RYGB (2011, Dr Grossman), hx ectopic pregnancy s/p fallopian tube removal (R)  PSHx - fallopian tube removal (2020) 2/2 ectopic pregnancy, RYGB (2011)  Rx - none  Supplements/herbs/OTC - none  A/C or NSAIDs? - sparingly   FMHx - mother - DM, CVA, HTN; father - cancer, unk type. Sister- H&N surgery for oral tumor; no CRC or GI related malignancy; no IBD  Allergies - NKDA  EtOH - 1-2x/month  Smoking - never smoker  Drugs - denies  Diet - avoids milk & rice bc of lactose intolerance and dumping syndrome   EGD - 2011 - normal per patient   Colonoscopy - no prior      PMH    Helicobacter pylori infection (041.86) (A04.8)  Morbid obesity (278.01) (E66.01)  MINH (obstructive sleep apnea) (327.23) (G47.33)  Preop pulmonary/respiratory exam (V72.82) (Z01.811)  Pre-operative cardiovascular examination (V72.81) (Z01.810)  S/P gastric bypass (V45.86) (Z98.84)  Screening for viral disease (V73.99) (Z11.59)  Snoring (786.09) (R06.83)  SOB (shortness of breath) (786.05) (R06.02)  Vitamin D deficiency (268.9) (E55.9)    History of Helicobacter pylori infection (V12.09) (Z86.19)         Surgical History    History of Abdominoplasty  History of Fallopian tube surgical procedure  History of Gastric Surgery For Morbid Obesity HPI- 28F PMHx Class II obesity s/p RYGB (2011, Dr Grossman), hx ectopic pregnancy s/p fallopian tube removal (R) presenting to clinic for EGD evaluation for possible bypass revision.      EGD for evaluation prior to consideration of revision of her RYGB.   States undergoing a RYGB in 2011 with Dr Grossman.   Prior to surgery, states her weight to be >200 lb.   Chad weight of 155 lb however following an ectopic pregnancy in 2020 requiring left fallopian tube removal, she had gained a lot of weight back. Current weight of 228 lb (BMI 37.94).     More recently noted having 2 days of diarrhea, non-bloody which resolved on its own.   Lastly, notes having an episode of lower abdominal pain, prompting her to go to the ED at [facility] ([facility] [place]). In the ED had a CT A/P performed (8/4/23), noting a 4.6 x 3.6 cm soft tissue attenuating mass centered between the left ovary and uterine fundus, possible fallopian in origin. Had pregnancy test performed then which was negative but did say she had a positive pregnancy test earlier in May 2023. States she has followed up with her Gyn since her ED visit, and rule out for an ectopic pregnancy. States that she still needs to get a MRI performed to further evaluate this soft tissue mass.     PMHx - Class II obesity s/p RYGB (2011, Dr Grossman), hx ectopic pregnancy s/p fallopian tube removal (R)  PSHx - fallopian tube removal (2020) 2/2 ectopic pregnancy, RYGB (2011)  Rx - none  Supplements/herbs/OTC - none  A/C or NSAIDs? - sparingly   FMHx - mother - DM, CVA, HTN; father - cancer, unk type. Sister- H&N surgery for oral tumor; no CRC or GI related malignancy; no IBD  Allergies - NKDA  EtOH - 1-2x/month  Smoking - never smoker  Drugs - denies  Diet - avoids milk & rice bc of lactose intolerance and dumping syndrome   EGD - 2011 - normal per patient   Colonoscopy - no prior      PMH    Helicobacter pylori infection (041.86) (A04.8)  Morbid obesity (278.01) (E66.01)  MINH (obstructive sleep apnea) (327.23) (G47.33)  Preop pulmonary/respiratory exam (V72.82) (Z01.811)  Pre-operative cardiovascular examination (V72.81) (Z01.810)  S/P gastric bypass (V45.86) (Z98.84)  Screening for viral disease (V73.99) (Z11.59)  Snoring (786.09) (R06.83)  SOB (shortness of breath) (786.05) (R06.02)  Vitamin D deficiency (268.9) (E55.9)    History of Helicobacter pylori infection (V12.09) (Z86.19)         Surgical History    History of Abdominoplasty  History of Fallopian tube surgical procedure  History of Gastric Surgery For Morbid Obesity

## 2024-01-09 NOTE — PRE-ANESTHESIA EVALUATION ADULT - NSANTHOSAYNRD_GEN_A_CORE
No. MINH screening performed.  STOP BANG Legend: 0-2 = LOW Risk; 3-4 = INTERMEDIATE Risk; 5-8 = HIGH Risk

## 2024-01-10 ENCOUNTER — NON-APPOINTMENT (OUTPATIENT)
Age: 29
End: 2024-01-10

## 2024-01-10 ENCOUNTER — APPOINTMENT (OUTPATIENT)
Dept: GASTROENTEROLOGY | Facility: CLINIC | Age: 29
End: 2024-01-10
Payer: MEDICAID

## 2024-01-10 LAB
SURGICAL PATHOLOGY STUDY: SIGNIFICANT CHANGE UP
SURGICAL PATHOLOGY STUDY: SIGNIFICANT CHANGE UP

## 2024-01-10 PROCEDURE — 99443: CPT

## 2024-01-10 RX ORDER — DOXYCYCLINE HYCLATE 100 MG/1
100 CAPSULE ORAL TWICE DAILY
Qty: 28 | Refills: 0 | Status: ACTIVE | COMMUNITY
Start: 2024-01-10 | End: 1900-01-01

## 2024-01-10 RX ORDER — METRONIDAZOLE 250 MG/1
250 TABLET ORAL
Qty: 56 | Refills: 0 | Status: ACTIVE | COMMUNITY
Start: 2024-01-10 | End: 1900-01-01

## 2024-01-10 RX ORDER — OMEPRAZOLE 40 MG/1
40 CAPSULE, DELAYED RELEASE ORAL TWICE DAILY
Qty: 28 | Refills: 0 | Status: ACTIVE | COMMUNITY
Start: 2024-01-10 | End: 1900-01-01

## 2024-01-12 ENCOUNTER — NON-APPOINTMENT (OUTPATIENT)
Age: 29
End: 2024-01-12

## 2024-01-12 LAB
ANION GAP SERPL CALC-SCNC: 9 MMOL/L
BASOPHILS # BLD AUTO: 0.02 K/UL
BASOPHILS NFR BLD AUTO: 0.3 %
BUN SERPL-MCNC: 13 MG/DL
CALCIUM SERPL-MCNC: 9.1 MG/DL
CHLORIDE SERPL-SCNC: 105 MMOL/L
CO2 SERPL-SCNC: 26 MMOL/L
CREAT SERPL-MCNC: 0.68 MG/DL
EGFR: 120 ML/MIN/1.73M2
EOSINOPHIL # BLD AUTO: 0.09 K/UL
EOSINOPHIL NFR BLD AUTO: 1.5 %
GLUCOSE SERPL-MCNC: 86 MG/DL
HCT VFR BLD CALC: 34.6 %
HGB BLD-MCNC: 10.4 G/DL
IMM GRANULOCYTES NFR BLD AUTO: 0.2 %
LYMPHOCYTES # BLD AUTO: 3.01 K/UL
LYMPHOCYTES NFR BLD AUTO: 49.1 %
MAN DIFF?: NORMAL
MCHC RBC-ENTMCNC: 24.3 PG
MCHC RBC-ENTMCNC: 30.1 GM/DL
MCV RBC AUTO: 80.8 FL
MONOCYTES # BLD AUTO: 0.42 K/UL
MONOCYTES NFR BLD AUTO: 6.9 %
NEUTROPHILS # BLD AUTO: 2.58 K/UL
NEUTROPHILS NFR BLD AUTO: 42 %
PLATELET # BLD AUTO: 276 K/UL
POTASSIUM SERPL-SCNC: 4.6 MMOL/L
RBC # BLD: 4.28 M/UL
RBC # FLD: 15.6 %
SODIUM SERPL-SCNC: 140 MMOL/L
WBC # FLD AUTO: 6.13 K/UL

## 2024-02-15 ENCOUNTER — APPOINTMENT (OUTPATIENT)
Dept: SURGERY | Facility: CLINIC | Age: 29
End: 2024-02-15

## 2024-03-07 ENCOUNTER — APPOINTMENT (OUTPATIENT)
Dept: SURGERY | Facility: CLINIC | Age: 29
End: 2024-03-07
Payer: MEDICAID

## 2024-03-07 VITALS
HEIGHT: 65 IN | WEIGHT: 223 LBS | SYSTOLIC BLOOD PRESSURE: 125 MMHG | DIASTOLIC BLOOD PRESSURE: 80 MMHG | BODY MASS INDEX: 37.15 KG/M2 | HEART RATE: 69 BPM

## 2024-03-07 DIAGNOSIS — Z98.84 BARIATRIC SURGERY STATUS: ICD-10-CM

## 2024-03-07 DIAGNOSIS — E66.01 MORBID (SEVERE) OBESITY DUE TO EXCESS CALORIES: ICD-10-CM

## 2024-03-07 PROCEDURE — 99214 OFFICE O/P EST MOD 30 MIN: CPT

## 2024-03-07 RX ORDER — TIRZEPATIDE 5 MG/.5ML
5 INJECTION, SOLUTION SUBCUTANEOUS
Qty: 4 | Refills: 3 | Status: ACTIVE | COMMUNITY
Start: 2024-03-07 | End: 1900-01-01

## 2024-03-07 NOTE — HISTORY OF PRESENT ILLNESS
[de-identified] :  s/p gastric bypass  13 yr ago struggling  with weight gain . She was seen last fall and underwent work up with  Upper G and EGD which showed a relatively normal anatomy with anastomotic dilation. . her weight remains relatively stable with BMI of 37

## 2024-03-07 NOTE — ASSESSMENT
[FreeTextEntry1] :  BMI of 37  s/p gastric bypass 13 yrs ago No major findings on work up, relatively normal postop anatomy. Revision would not be very beneficial Recommended medical management with breanne and follow up with Beverly Arriola
No

## 2024-03-08 ENCOUNTER — APPOINTMENT (OUTPATIENT)
Dept: GASTROENTEROLOGY | Facility: CLINIC | Age: 29
End: 2024-03-08
Payer: MEDICAID

## 2024-03-08 PROCEDURE — 99443: CPT

## 2024-03-08 RX ORDER — BISMUTH SUBSALICYLATE 262 MG/1
262 TABLET, CHEWABLE ORAL 4 TIMES DAILY
Qty: 112 | Refills: 0 | Status: ACTIVE | COMMUNITY
Start: 2024-01-10 | End: 1900-01-01

## 2024-03-14 ENCOUNTER — TRANSCRIPTION ENCOUNTER (OUTPATIENT)
Age: 29
End: 2024-03-14

## 2024-04-24 ENCOUNTER — APPOINTMENT (OUTPATIENT)
Dept: BARIATRICS/WEIGHT MGMT | Facility: CLINIC | Age: 29
End: 2024-04-24

## 2024-07-11 ENCOUNTER — APPOINTMENT (OUTPATIENT)
Dept: SURGERY | Facility: CLINIC | Age: 29
End: 2024-07-11

## 2024-07-18 DIAGNOSIS — Z98.84 BARIATRIC SURGERY STATUS: ICD-10-CM

## 2024-07-18 DIAGNOSIS — E66.9 OBESITY, UNSPECIFIED: ICD-10-CM

## 2024-07-18 RX ORDER — TIRZEPATIDE 2.5 MG/.5ML
2.5 INJECTION, SOLUTION SUBCUTANEOUS
Qty: 2 | Refills: 3 | Status: ACTIVE | COMMUNITY
Start: 2024-07-18 | End: 1900-01-01

## 2024-12-05 ENCOUNTER — APPOINTMENT (OUTPATIENT)
Dept: SURGERY | Facility: CLINIC | Age: 29
End: 2024-12-05

## 2025-03-06 ENCOUNTER — APPOINTMENT (OUTPATIENT)
Dept: SURGERY | Facility: CLINIC | Age: 30
End: 2025-03-06

## 2025-03-29 ENCOUNTER — NON-APPOINTMENT (OUTPATIENT)
Age: 30
End: 2025-03-29

## 2025-04-14 ENCOUNTER — APPOINTMENT (OUTPATIENT)
Dept: SURGERY | Facility: CLINIC | Age: 30
End: 2025-04-14

## 2025-06-30 ENCOUNTER — APPOINTMENT (OUTPATIENT)
Dept: SURGERY | Facility: CLINIC | Age: 30
End: 2025-06-30

## 2025-09-18 ENCOUNTER — APPOINTMENT (OUTPATIENT)
Dept: SURGERY | Facility: CLINIC | Age: 30
End: 2025-09-18